# Patient Record
Sex: MALE | Race: WHITE | NOT HISPANIC OR LATINO | Employment: FULL TIME | URBAN - METROPOLITAN AREA
[De-identification: names, ages, dates, MRNs, and addresses within clinical notes are randomized per-mention and may not be internally consistent; named-entity substitution may affect disease eponyms.]

---

## 2018-07-26 ENCOUNTER — HOSPITAL ENCOUNTER (EMERGENCY)
Facility: HOSPITAL | Age: 33
Discharge: HOME | End: 2018-07-26
Attending: EMERGENCY MEDICINE | Admitting: EMERGENCY MEDICINE

## 2018-07-26 ENCOUNTER — APPOINTMENT (OUTPATIENT)
Dept: GENERAL RADIOLOGY | Facility: HOSPITAL | Age: 33
End: 2018-07-26

## 2018-07-26 VITALS
DIASTOLIC BLOOD PRESSURE: 72 MMHG | RESPIRATION RATE: 18 BRPM | HEART RATE: 54 BPM | HEIGHT: 72 IN | OXYGEN SATURATION: 97 % | SYSTOLIC BLOOD PRESSURE: 122 MMHG | BODY MASS INDEX: 27.77 KG/M2 | TEMPERATURE: 98.1 F | WEIGHT: 205 LBS

## 2018-07-26 DIAGNOSIS — S61.511A LACERATION OF RIGHT WRIST, INITIAL ENCOUNTER: Primary | ICD-10-CM

## 2018-07-26 PROCEDURE — 73110 X-RAY EXAM OF WRIST: CPT

## 2018-07-26 PROCEDURE — 99282 EMERGENCY DEPT VISIT SF MDM: CPT

## 2018-07-26 RX ORDER — LIDOCAINE HYDROCHLORIDE AND EPINEPHRINE 10; 10 MG/ML; UG/ML
10 INJECTION, SOLUTION INFILTRATION; PERINEURAL ONCE
Status: DISCONTINUED | OUTPATIENT
Start: 2018-07-26 | End: 2018-07-26 | Stop reason: HOSPADM

## 2018-07-26 RX ORDER — HYDROCODONE BITARTRATE AND ACETAMINOPHEN 5; 325 MG/1; MG/1
1 TABLET ORAL EVERY 6 HOURS PRN
Qty: 6 TABLET | Refills: 0 | Status: SHIPPED | OUTPATIENT
Start: 2018-07-26 | End: 2021-09-16

## 2018-07-26 RX ORDER — CEPHALEXIN 500 MG/1
500 CAPSULE ORAL 3 TIMES DAILY
Qty: 30 CAPSULE | Refills: 0 | Status: SHIPPED | OUTPATIENT
Start: 2018-07-26 | End: 2021-09-16

## 2021-09-16 ENCOUNTER — HOSPITAL ENCOUNTER (EMERGENCY)
Facility: HOSPITAL | Age: 36
Discharge: HOME | End: 2021-09-16
Attending: EMERGENCY MEDICINE | Admitting: EMERGENCY MEDICINE

## 2021-09-16 VITALS
BODY MASS INDEX: 20.54 KG/M2 | WEIGHT: 155 LBS | SYSTOLIC BLOOD PRESSURE: 158 MMHG | OXYGEN SATURATION: 98 % | HEART RATE: 92 BPM | DIASTOLIC BLOOD PRESSURE: 97 MMHG | TEMPERATURE: 98.4 F | RESPIRATION RATE: 17 BRPM | HEIGHT: 73 IN

## 2021-09-16 DIAGNOSIS — J30.2 SEASONAL ALLERGIC RHINITIS, UNSPECIFIED TRIGGER: ICD-10-CM

## 2021-09-16 DIAGNOSIS — J01.10 ACUTE NON-RECURRENT FRONTAL SINUSITIS: ICD-10-CM

## 2021-09-16 DIAGNOSIS — J02.9 VIRAL PHARYNGITIS: Primary | ICD-10-CM

## 2021-09-16 LAB — S PYO AG THROAT QL: NEGATIVE

## 2021-09-16 PROCEDURE — 99283 EMERGENCY DEPT VISIT LOW MDM: CPT

## 2021-09-16 PROCEDURE — 87081 CULTURE SCREEN ONLY: CPT | Performed by: NURSE PRACTITIONER

## 2021-09-16 PROCEDURE — 87880 STREP A ASSAY W/OPTIC: CPT | Performed by: NURSE PRACTITIONER

## 2021-09-16 RX ORDER — IBUPROFEN 400 MG/1
800 TABLET ORAL ONCE
Status: COMPLETED | OUTPATIENT
Start: 2021-09-16 | End: 2021-09-16

## 2021-09-16 RX ORDER — BUPROPION HYDROCHLORIDE 75 MG/1
75 TABLET ORAL 2 TIMES DAILY
COMMUNITY
End: 2023-01-11

## 2021-09-16 RX ORDER — FLUTICASONE PROPIONATE 50 MCG
2 SPRAY, SUSPENSION (ML) NASAL DAILY
Qty: 16 G | Refills: 0 | Status: SHIPPED | OUTPATIENT
Start: 2021-09-16 | End: 2023-01-11

## 2021-09-16 RX ORDER — CETIRIZINE HYDROCHLORIDE, PSEUDOEPHEDRINE HYDROCHLORIDE 5; 120 MG/1; MG/1
1 TABLET, FILM COATED, EXTENDED RELEASE ORAL 2 TIMES DAILY
Qty: 60 TABLET | Refills: 0 | Status: SHIPPED | OUTPATIENT
Start: 2021-09-16 | End: 2021-10-16

## 2021-09-16 RX ADMIN — IBUPROFEN 800 MG: 400 TABLET, FILM COATED ORAL at 18:28

## 2021-09-16 NOTE — ED PROVIDER NOTES
Subjective   Sore throat, postnasal drip and sinus drainage for 3 days.      History provided by:  Patient   used: No        Review of Systems   Constitutional: Negative.    HENT: Positive for postnasal drip, rhinorrhea, sinus pressure and sore throat.    Eyes: Negative.    Respiratory: Negative.    Cardiovascular: Negative.    Gastrointestinal: Negative.    Endocrine: Negative.    Genitourinary: Negative.    Musculoskeletal: Negative.    Skin: Negative.    Allergic/Immunologic: Negative.    Neurological: Negative.    Hematological: Negative.    Psychiatric/Behavioral: Negative.        Past Medical History:   Diagnosis Date   • Allergy to alpha-gal    • Depression        No Known Allergies    Past Surgical History:   Procedure Laterality Date   • EXPLORATORY LAPAROTOMY         History reviewed. No pertinent family history.    Social History     Socioeconomic History   • Marital status: Single     Spouse name: Not on file   • Number of children: Not on file   • Years of education: Not on file   • Highest education level: Not on file   Tobacco Use   • Smoking status: Former Smoker     Packs/day: 0.50     Years: 3.00     Pack years: 1.50   • Smokeless tobacco: Current User     Types: Chew   Substance and Sexual Activity   • Alcohol use: No   • Drug use: No           Objective   Physical Exam  Constitutional:       Appearance: Normal appearance.   HENT:      Head: Normocephalic and atraumatic.      Right Ear: Tympanic membrane normal.      Left Ear: Tympanic membrane normal.      Nose: Congestion and rhinorrhea present.      Mouth/Throat:      Mouth: Mucous membranes are moist.      Pharynx: Posterior oropharyngeal erythema present.      Tonsils: No tonsillar exudate or tonsillar abscesses. 1+ on the right. 1+ on the left.   Eyes:      Pupils: Pupils are equal, round, and reactive to light.   Cardiovascular:      Rate and Rhythm: Normal rate and regular rhythm.      Pulses: Normal pulses.    Pulmonary:      Effort: Pulmonary effort is normal.      Breath sounds: Normal breath sounds.   Abdominal:      General: Abdomen is flat. Bowel sounds are normal.      Palpations: Abdomen is soft.   Musculoskeletal:         General: Normal range of motion.      Cervical back: Normal range of motion.   Skin:     General: Skin is warm and dry.      Capillary Refill: Capillary refill takes less than 2 seconds.   Neurological:      General: No focal deficit present.      Mental Status: He is alert and oriented to person, place, and time. Mental status is at baseline.   Psychiatric:         Mood and Affect: Mood normal.         Procedures           ED Course                                           MDM  Number of Diagnoses or Management Options  Acute non-recurrent frontal sinusitis: minor  Seasonal allergic rhinitis, unspecified trigger: minor  Viral pharyngitis: new and requires workup     Amount and/or Complexity of Data Reviewed  Clinical lab tests: reviewed and ordered    Risk of Complications, Morbidity, and/or Mortality  Presenting problems: low  Diagnostic procedures: low  Management options: low    Patient Progress  Patient progress: stable      Final diagnoses:   Viral pharyngitis   Acute non-recurrent frontal sinusitis   Seasonal allergic rhinitis, unspecified trigger       ED Disposition  ED Disposition     ED Disposition Condition Comment    Discharge Stable           Provider, No Known  [place] [id number]      As needed         Medication List      New Prescriptions    cetirizine-pseudoephedrine 5-120 MG per 12 hr tablet  Commonly known as: ZyrTEC-D  Take 1 tablet by mouth 2 (Two) Times a Day for 30 days.     fluticasone 50 MCG/ACT nasal spray  Commonly known as: FLONASE  2 sprays into the nostril(s) as directed by provider Daily.           Where to Get Your Medications      These medications were sent to [id number] - [place], [place] - [address] AT [place]  [facility] - [phone] [facility] [phone] [address]  [address] [place] [address]    Hours: 24-hours Phone: [phone]   · cetirizine-pseudoephedrine 5-120 MG per 12 hr tablet  · fluticasone 50 MCG/ACT nasal spray          Lorie Morse, YIMI  09/16/21 1927

## 2021-09-19 LAB — BACTERIA SPEC AEROBE CULT: NORMAL

## 2022-01-20 PROCEDURE — U0004 COV-19 TEST NON-CDC HGH THRU: HCPCS | Performed by: NURSE PRACTITIONER

## 2022-01-21 ENCOUNTER — TELEPHONE (OUTPATIENT)
Dept: URGENT CARE | Facility: CLINIC | Age: 37
End: 2022-01-21

## 2022-01-21 NOTE — TELEPHONE ENCOUNTER
Called patient, results reviewed, symptoms are stable, continue quarantine and supportive care, follow up as needed or if symptoms worsen or persist, patient verbalizes understanding.

## 2022-06-05 ENCOUNTER — HOSPITAL ENCOUNTER (EMERGENCY)
Facility: HOSPITAL | Age: 37
Discharge: HOME | End: 2022-06-06
Attending: EMERGENCY MEDICINE | Admitting: EMERGENCY MEDICINE

## 2022-06-05 DIAGNOSIS — T78.40XA ALLERGIC REACTION, INITIAL ENCOUNTER: Primary | ICD-10-CM

## 2022-06-05 PROCEDURE — 25010000002 DIPHENHYDRAMINE PER 50 MG: Performed by: EMERGENCY MEDICINE

## 2022-06-05 PROCEDURE — 96374 THER/PROPH/DIAG INJ IV PUSH: CPT

## 2022-06-05 PROCEDURE — 25010000002 METHYLPREDNISOLONE PER 125 MG: Performed by: EMERGENCY MEDICINE

## 2022-06-05 PROCEDURE — 96375 TX/PRO/DX INJ NEW DRUG ADDON: CPT

## 2022-06-05 PROCEDURE — 99283 EMERGENCY DEPT VISIT LOW MDM: CPT

## 2022-06-05 RX ORDER — METHYLPREDNISOLONE SODIUM SUCCINATE 125 MG/2ML
125 INJECTION, POWDER, LYOPHILIZED, FOR SOLUTION INTRAMUSCULAR; INTRAVENOUS ONCE
Status: COMPLETED | OUTPATIENT
Start: 2022-06-05 | End: 2022-06-05

## 2022-06-05 RX ORDER — DIPHENHYDRAMINE HYDROCHLORIDE 50 MG/ML
25 INJECTION INTRAMUSCULAR; INTRAVENOUS ONCE
Status: COMPLETED | OUTPATIENT
Start: 2022-06-05 | End: 2022-06-05

## 2022-06-05 RX ORDER — CITALOPRAM 10 MG/1
10 TABLET ORAL DAILY
COMMUNITY

## 2022-06-05 RX ORDER — FAMOTIDINE 10 MG/ML
20 INJECTION, SOLUTION INTRAVENOUS ONCE
Status: COMPLETED | OUTPATIENT
Start: 2022-06-05 | End: 2022-06-05

## 2022-06-05 RX ADMIN — DIPHENHYDRAMINE HYDROCHLORIDE 25 MG: 50 INJECTION, SOLUTION INTRAMUSCULAR; INTRAVENOUS at 22:03

## 2022-06-05 RX ADMIN — METHYLPREDNISOLONE SODIUM SUCCINATE 125 MG: 125 INJECTION, POWDER, FOR SOLUTION INTRAMUSCULAR; INTRAVENOUS at 22:04

## 2022-06-05 RX ADMIN — FAMOTIDINE 20 MG: 10 INJECTION INTRAVENOUS at 22:04

## 2022-06-06 VITALS
DIASTOLIC BLOOD PRESSURE: 79 MMHG | WEIGHT: 205.69 LBS | BODY MASS INDEX: 27.26 KG/M2 | SYSTOLIC BLOOD PRESSURE: 131 MMHG | OXYGEN SATURATION: 94 % | TEMPERATURE: 98.4 F | HEART RATE: 66 BPM | RESPIRATION RATE: 24 BRPM | HEIGHT: 73 IN

## 2022-06-06 NOTE — ED PROVIDER NOTES
Time: 10:40 PM EDT  Arrived by: private car  Chief Complaint: Allergic reaction  History provided by: Patient  History is limited by: N/A     History of Present Illness:  Patient is a 37 y.o. year old male who presents to the emergency department with complaints of allergic reaction.  Patient states he has alpha gal and has had multiple allergic reactions before.  States drank a protein shake and pre work-out drink then shortly after began having allergic reaction.    HPI    Similar Symptoms Previously: Yes  Recently seen: No      Patient Care Team  Primary Care Provider: Provider, No Known    Past Medical History:     Allergies   Allergen Reactions   • Other Anaphylaxis     Red meat, pork due to tick bite.      Past Medical History:   Diagnosis Date   • Allergy to alpha-gal    • Depression      Past Surgical History:   Procedure Laterality Date   • EXPLORATORY LAPAROTOMY       Family History   Problem Relation Age of Onset   • No Known Problems Mother    • No Known Problems Father    • No Known Problems Sister    • No Known Problems Brother    • No Known Problems Son    • No Known Problems Daughter    • No Known Problems Maternal Grandmother    • No Known Problems Maternal Grandfather    • No Known Problems Paternal Grandmother    • No Known Problems Paternal Grandfather    • No Known Problems Cousin    • Rheum arthritis Neg Hx    • Osteoarthritis Neg Hx    • Asthma Neg Hx    • Diabetes Neg Hx    • Heart failure Neg Hx    • Hyperlipidemia Neg Hx    • Hypertension Neg Hx    • Migraines Neg Hx    • Rashes / Skin problems Neg Hx    • Seizures Neg Hx    • Stroke Neg Hx    • Thyroid disease Neg Hx        Home Medications:  Prior to Admission medications    Medication Sig Start Date End Date Taking? Authorizing Provider   buPROPion (WELLBUTRIN) 75 MG tablet Take 75 mg by mouth 2 (Two) Times a Day.    Provider, MD Rosi   buPROPion SR (WELLBUTRIN SR) 150 MG 12 hr tablet Take 150 mg by mouth 2 (Two) Times a Day.  "1/26/22   Emergency, Nurse Epic, RN   cetirizine-pseudoephedrine (ZyrTEC-D) 5-120 MG per 12 hr tablet Take 1 tablet by mouth 2 (Two) Times a Day for 30 days. 9/16/21 10/16/21  Lorie Morse APRN   citalopram (CeleXA) 10 MG tablet Take 10 mg by mouth Daily.    Provider, MD Rosi   fluticasone (FLONASE) 50 MCG/ACT nasal spray 2 sprays into the nostril(s) as directed by provider Daily. 9/16/21   Lorie Morse APRN   ondansetron ODT (ZOFRAN-ODT) 4 MG disintegrating tablet Place 1 tablet on the tongue Every 8 (Eight) Hours As Needed for Nausea or Vomiting. 4/7/22   Tatiana Ordoñez APRN   sildenafil (REVATIO) 20 MG tablet TAKE 3 TO 5 TABLETS BY MOUTH DAILY AS NEEDED 1/20/22   Emergency, Nurse Abby RN        Social History:   Social History     Tobacco Use   • Smoking status: Former Smoker     Packs/day: 0.50     Years: 3.00     Pack years: 1.50   • Smokeless tobacco: Former User     Types: Chew   Vaping Use   • Vaping Use: Every day   • Substances: Nicotine   Substance Use Topics   • Alcohol use: No   • Drug use: Not Currently     Types: IV, Methamphetamines     Recent travel: no     Review of Systems:  Review of Systems   Constitutional: Negative for chills and fever.   HENT: Negative for congestion, rhinorrhea and sore throat.    Eyes: Negative for pain and visual disturbance.   Respiratory: Negative for apnea, cough, chest tightness and shortness of breath.    Cardiovascular: Negative for chest pain and palpitations.   Gastrointestinal: Negative for abdominal pain, diarrhea, nausea and vomiting.   Genitourinary: Negative for difficulty urinating and dysuria.   Musculoskeletal: Negative for joint swelling and myalgias.   Skin: Negative for color change.   Neurological: Negative for seizures and headaches.   Psychiatric/Behavioral: Negative.    All other systems reviewed and are negative.       Physical Exam:  /75   Pulse 67   Temp 98.4 °F (36.9 °C) (Oral)   Resp 24   Ht 185.4 cm (73\")   " Wt 93.3 kg (205 lb 11 oz)   SpO2 94%   BMI 27.14 kg/m²     Physical Exam  Vitals and nursing note reviewed.   Constitutional:       General: He is not in acute distress.     Appearance: Normal appearance. He is not toxic-appearing.   HENT:      Head: Normocephalic and atraumatic.      Jaw: There is normal jaw occlusion.   Eyes:      General: Lids are normal.      Extraocular Movements: Extraocular movements intact.      Conjunctiva/sclera: Conjunctivae normal.      Pupils: Pupils are equal, round, and reactive to light.   Cardiovascular:      Rate and Rhythm: Normal rate and regular rhythm.      Pulses: Normal pulses.      Heart sounds: Normal heart sounds.   Pulmonary:      Effort: Pulmonary effort is normal. No respiratory distress.      Breath sounds: Normal breath sounds. No wheezing or rhonchi.   Abdominal:      General: Abdomen is flat.      Palpations: Abdomen is soft.      Tenderness: There is no abdominal tenderness. There is no guarding or rebound.   Musculoskeletal:         General: Normal range of motion.      Cervical back: Normal range of motion and neck supple.      Right lower leg: No edema.      Left lower leg: No edema.   Skin:     General: Skin is warm.      Comments: Diaphoretic.  Hives to the neck, bilateral upper extremities, chest, abdomen.   Neurological:      Mental Status: He is alert and oriented to person, place, and time. Mental status is at baseline.   Psychiatric:         Mood and Affect: Mood normal.                Medications in the Emergency Department:  Medications   diphenhydrAMINE (BENADRYL) injection 25 mg (25 mg Intravenous Given 6/5/22 2203)   methylPREDNISolone sodium succinate (SOLU-Medrol) injection 125 mg (125 mg Intravenous Given 6/5/22 2204)   famotidine (PEPCID) injection 20 mg (20 mg Intravenous Given 6/5/22 2204)        Labs  Lab Results (last 24 hours)     ** No results found for the last 24 hours. **           Imaging:  No Radiology Exams Resulted Within Past 24  Hours    Procedures:  Procedures    Progress                            Medical Decision Making:  MDM  Number of Diagnoses or Management Options  Allergic reaction, initial encounter  Diagnosis management comments: In summary this is a 37-year-old male reports history of alpha gal and states he is having allergic reaction after drinking a protein shake.  After medication including Solu-Medrol, Pepcid, Benadryl in the emergency department his reaction has cleared and he feels better.  Very strict return to ER and follow-up instructions have been provided to the patient.         Final diagnoses:   Allergic reaction, initial encounter        Disposition:  ED Disposition     ED Disposition   Discharge    Condition   Stable    Comment   --             This medical record created using voice recognition software.           August Villareal MD  06/06/22 0018

## 2024-11-16 ENCOUNTER — HOSPITAL ENCOUNTER (EMERGENCY)
Facility: HOSPITAL | Age: 39
Discharge: HOME | End: 2024-11-16
Attending: EMERGENCY MEDICINE
Payer: COMMERCIAL

## 2024-11-16 VITALS
HEART RATE: 66 BPM | WEIGHT: 215.61 LBS | OXYGEN SATURATION: 96 % | RESPIRATION RATE: 15 BRPM | DIASTOLIC BLOOD PRESSURE: 80 MMHG | HEIGHT: 73 IN | BODY MASS INDEX: 28.58 KG/M2 | SYSTOLIC BLOOD PRESSURE: 125 MMHG | TEMPERATURE: 97.6 F

## 2024-11-16 DIAGNOSIS — T78.1XXA ALLERGIC REACTION TO ALPHA-GAL: Primary | ICD-10-CM

## 2024-11-16 PROCEDURE — 25010000002 METHYLPREDNISOLONE PER 125 MG: Performed by: EMERGENCY MEDICINE

## 2024-11-16 PROCEDURE — 25010000002 DIPHENHYDRAMINE PER 50 MG: Performed by: EMERGENCY MEDICINE

## 2024-11-16 PROCEDURE — 99283 EMERGENCY DEPT VISIT LOW MDM: CPT

## 2024-11-16 PROCEDURE — 96374 THER/PROPH/DIAG INJ IV PUSH: CPT

## 2024-11-16 PROCEDURE — 96375 TX/PRO/DX INJ NEW DRUG ADDON: CPT

## 2024-11-16 RX ORDER — FAMOTIDINE 10 MG/ML
20 INJECTION, SOLUTION INTRAVENOUS ONCE
Status: COMPLETED | OUTPATIENT
Start: 2024-11-16 | End: 2024-11-16

## 2024-11-16 RX ORDER — CALCIUM CARBONATE 500 MG/1
2 TABLET, CHEWABLE ORAL ONCE
Status: COMPLETED | OUTPATIENT
Start: 2024-11-16 | End: 2024-11-16

## 2024-11-16 RX ORDER — DESVENLAFAXINE 50 MG/1
50 TABLET, FILM COATED, EXTENDED RELEASE ORAL DAILY
COMMUNITY

## 2024-11-16 RX ORDER — PREDNISONE 50 MG/1
50 TABLET ORAL DAILY
Qty: 5 TABLET | Refills: 0 | Status: SHIPPED | OUTPATIENT
Start: 2024-11-16 | End: 2024-11-21

## 2024-11-16 RX ORDER — METHYLPREDNISOLONE SODIUM SUCCINATE 125 MG/2ML
125 INJECTION, POWDER, LYOPHILIZED, FOR SOLUTION INTRAMUSCULAR; INTRAVENOUS ONCE
Status: COMPLETED | OUTPATIENT
Start: 2024-11-16 | End: 2024-11-16

## 2024-11-16 RX ORDER — SODIUM CHLORIDE 0.9 % (FLUSH) 0.9 %
10 SYRINGE (ML) INJECTION AS NEEDED
Status: DISCONTINUED | OUTPATIENT
Start: 2024-11-16 | End: 2024-11-16 | Stop reason: HOSPADM

## 2024-11-16 RX ORDER — DIPHENHYDRAMINE HYDROCHLORIDE 50 MG/ML
25 INJECTION INTRAMUSCULAR; INTRAVENOUS ONCE
Status: COMPLETED | OUTPATIENT
Start: 2024-11-16 | End: 2024-11-16

## 2024-11-16 RX ORDER — EPINEPHRINE 0.3 MG/.3ML
0.3 INJECTION SUBCUTANEOUS ONCE
Qty: 1 EACH | Refills: 0 | Status: SHIPPED | OUTPATIENT
Start: 2024-11-16 | End: 2024-11-16

## 2024-11-16 RX ADMIN — FAMOTIDINE 20 MG: 10 INJECTION INTRAVENOUS at 04:31

## 2024-11-16 RX ADMIN — CALCIUM CARBONATE 2 TABLET: 500 TABLET, CHEWABLE ORAL at 05:09

## 2024-11-16 RX ADMIN — METHYLPREDNISOLONE SODIUM SUCCINATE 125 MG: 125 INJECTION, POWDER, FOR SOLUTION INTRAMUSCULAR; INTRAVENOUS at 04:31

## 2024-11-16 RX ADMIN — DIPHENHYDRAMINE HYDROCHLORIDE 25 MG: 50 INJECTION, SOLUTION INTRAMUSCULAR; INTRAVENOUS at 04:31

## 2024-11-16 NOTE — Clinical Note
[facility] EMERGENCY ROOM  [address] [place] [address]  Phone: [phone]  Fax: [phone]    Romulo Monahan was seen and treated in our emergency department on 11/16/2024.  He may return to work on 11/18/2024.         Thank you for choosing [facility].    Porfirio Barroso MD

## 2024-11-16 NOTE — ED PROVIDER NOTES
Time: 4:46 AM EST  Date of encounter:  11/16/2024  Independent Historian/Clinical History and Information was obtained by:   Patient    History is limited by: N/A    Chief Complaint: Allergic reaction      History of Present Illness:  Patient is a 39 y.o. year old male who presents to the emergency department for evaluation of allergic reaction to pork.  Patient states he ate pork at 7:30 PM yesterday.  He has known alpha gal syndrome but has been tolerating some meats recently.  Awoke prior to arrival with diffuse body burning/itching sensation with erythematous rash and some feeling of difficulty breathing and/or throat swelling.  He did take an EpiPen at home prior to arrival.      Patient Care Team  Primary Care Provider: Provider, No Known    Past Medical History:     Allergies   Allergen Reactions    Alpha-Gal Anaphylaxis    Other Anaphylaxis     Red meat, pork due to tick bite.      Past Medical History:   Diagnosis Date    Allergy to alpha-gal     Depression      Past Surgical History:   Procedure Laterality Date    EXPLORATORY LAPAROTOMY       Family History   Problem Relation Age of Onset    No Known Problems Mother     No Known Problems Father     No Known Problems Sister     No Known Problems Brother     No Known Problems Son     No Known Problems Daughter     No Known Problems Maternal Grandmother     No Known Problems Maternal Grandfather     No Known Problems Paternal Grandmother     No Known Problems Paternal Grandfather     No Known Problems Cousin     Rheum arthritis Neg Hx     Osteoarthritis Neg Hx     Asthma Neg Hx     Diabetes Neg Hx     Heart failure Neg Hx     Hyperlipidemia Neg Hx     Hypertension Neg Hx     Migraines Neg Hx     Rashes / Skin problems Neg Hx     Seizures Neg Hx     Stroke Neg Hx     Thyroid disease Neg Hx        Home Medications:  Prior to Admission medications    Medication Sig Start Date End Date Taking? Authorizing Provider   buPROPion SR (WELLBUTRIN SR) 150 MG 12 hr  "tablet Take 150 mg by mouth 2 (Two) Times a Day. 1/26/22   Emergency, Nurse Epic, RN   cetirizine-pseudoephedrine (ZyrTEC-D) 5-120 MG per 12 hr tablet Take 1 tablet by mouth 2 (Two) Times a Day for 30 days. 9/16/21 10/16/21  Lorie Morse APRN   desvenlafaxine (PRISTIQ) 50 MG 24 hr tablet Take 1 tablet by mouth Daily.    Provider, MD Rosi   citalopram (CeleXA) 10 MG tablet Take 10 mg by mouth Daily.  11/16/24  Provider, MD Rosi        Social History:   Social History     Tobacco Use    Smoking status: Former     Current packs/day: 0.50     Average packs/day: 0.5 packs/day for 3.0 years (1.5 ttl pk-yrs)     Types: Cigarettes    Smokeless tobacco: Current     Types: Chew   Vaping Use    Vaping status: Every Day    Substances: Nicotine   Substance Use Topics    Alcohol use: No    Drug use: Not Currently     Types: IV, Methamphetamines         Review of Systems:  Review of Systems   Constitutional:  Negative for chills and fever.   HENT:  Positive for trouble swallowing. Negative for congestion, rhinorrhea and sore throat.    Eyes:  Negative for photophobia.   Respiratory:  Positive for shortness of breath. Negative for apnea, cough and chest tightness.    Cardiovascular:  Negative for chest pain and palpitations.   Gastrointestinal:  Negative for abdominal pain, diarrhea, nausea and vomiting.   Endocrine: Negative.    Genitourinary:  Negative for difficulty urinating and dysuria.   Musculoskeletal:  Negative for back pain, joint swelling and myalgias.   Skin:  Positive for rash. Negative for color change and wound.   Allergic/Immunologic: Negative.    Neurological:  Negative for seizures and headaches.   Psychiatric/Behavioral: Negative.     All other systems reviewed and are negative.       Physical Exam:  /80 (Patient Position: Lying)   Pulse 66   Temp 97.6 °F (36.4 °C) (Oral)   Resp 15   Ht 185.4 cm (73\")   Wt 97.8 kg (215 lb 9.8 oz)   SpO2 96%   BMI 28.45 kg/m²     Physical " Exam  Vitals and nursing note reviewed.   Constitutional:       General: He is awake.      Appearance: Normal appearance.   HENT:      Head: Normocephalic and atraumatic.      Nose: Nose normal.      Mouth/Throat:      Mouth: Mucous membranes are moist.      Comments: No angioedema  Eyes:      Extraocular Movements: Extraocular movements intact.      Pupils: Pupils are equal, round, and reactive to light.   Cardiovascular:      Rate and Rhythm: Normal rate and regular rhythm.      Heart sounds: Normal heart sounds.   Pulmonary:      Effort: Pulmonary effort is normal. No respiratory distress.      Breath sounds: Normal breath sounds. No wheezing, rhonchi or rales.   Abdominal:      General: Bowel sounds are normal.      Palpations: Abdomen is soft.      Tenderness: There is no abdominal tenderness. There is no guarding or rebound.      Comments: No rigidity   Musculoskeletal:         General: No tenderness. Normal range of motion.      Cervical back: Normal range of motion and neck supple.   Skin:     General: Skin is warm and dry.      Coloration: Skin is not jaundiced.      Comments: Very faint generalized erythema with infrequent areas of mild possible urticaria.   Neurological:      General: No focal deficit present.      Mental Status: Mental status is at baseline.   Psychiatric:         Mood and Affect: Mood normal.                  Procedures:  Procedures      Medical Decision Making:      Comorbidities that affect care:    Depression, alpha-gal syndrome    External Notes reviewed:    None      The following orders were placed and all results were independently analyzed by me:  Orders Placed This Encounter   Procedures    Insert Peripheral IV       Medications Given in the Emergency Department:  Medications   sodium chloride 0.9 % flush 10 mL (has no administration in time range)   diphenhydrAMINE (BENADRYL) injection 25 mg (25 mg Intravenous Given 11/16/24 0436)   famotidine (PEPCID) injection 20 mg (20 mg  Intravenous Given 11/16/24 0431)   methylPREDNISolone sodium succinate (SOLU-Medrol) injection 125 mg (125 mg Intravenous Given 11/16/24 0431)   calcium carbonate (TUMS) chewable tablet 500 mg (200 mg elemental) (2 tablets Oral Given 11/16/24 1198)        ED Course:         Labs:    Lab Results (last 24 hours)       ** No results found for the last 24 hours. **             Imaging:    No Radiology Exams Resulted Within Past 24 Hours      Differential Diagnosis and Discussion:    Allergic Reaction: Differential diagnosis includes but is not limited to drug side effects, contact dermatitis, autoimmune conditions, infections, mast cell disorders, serum sickness, anaphylactoid reactions, angioedema, panic or anxiety attacks.        [facility]                     Patient Care Considerations:    ANTIBIOTICS: I considered prescribing antibiotics as an outpatient however no bacterial focus of infection was found.      Consultants/Shared Management Plan:    None    Social Determinants of Health:    Patient is independent, reliable, and has access to care.       Disposition and Care Coordination:    Discharged: The patient is suitable and stable for discharge with no need for consideration of admission.    I have explained the patient´s condition, diagnoses and treatment plan based on the information available to me at this time. I have answered questions and addressed any concerns. The patient has a good  understanding of the patient´s diagnosis, condition, and treatment plan as can be expected at this point. The vital signs have been stable. The patient´s condition is stable and appropriate for discharge from the emergency department.      The patient will pursue further outpatient evaluation with the primary care physician or other designated or consulting physician as outlined in the discharge instructions. They are agreeable to this plan of care and follow-up instructions have been explained in detail. The patient has received  these instructions in written format and has expressed an understanding of the discharge instructions. The patient is aware that any significant change in condition or worsening of symptoms should prompt an immediate return to this or the closest emergency department or call to 911.    Final diagnoses:   Allergic reaction to alpha-gal        ED Disposition       ED Disposition   Discharge    Condition   Stable    Comment   --               This medical record created using voice recognition software.             Porfirio Barroso MD  11/16/24 0720

## 2025-06-05 ENCOUNTER — OFFICE VISIT (OUTPATIENT)
Age: 40
End: 2025-06-05
Payer: COMMERCIAL

## 2025-06-05 VITALS
WEIGHT: 231 LBS | HEIGHT: 73 IN | SYSTOLIC BLOOD PRESSURE: 129 MMHG | HEART RATE: 90 BPM | OXYGEN SATURATION: 97 % | BODY MASS INDEX: 30.62 KG/M2 | DIASTOLIC BLOOD PRESSURE: 82 MMHG

## 2025-06-05 DIAGNOSIS — F12.20 TETRAHYDROCANNABINOL (THC) DEPENDENCE: ICD-10-CM

## 2025-06-05 DIAGNOSIS — F15.20 OTHER STIMULANT DEPENDENCE, UNCOMPLICATED: ICD-10-CM

## 2025-06-05 DIAGNOSIS — E34.9 HYPOTESTOSTERONISM: ICD-10-CM

## 2025-06-05 DIAGNOSIS — F41.1 GENERALIZED ANXIETY DISORDER: ICD-10-CM

## 2025-06-05 DIAGNOSIS — F11.21 OPIOID DEPENDENCE IN REMISSION: Primary | ICD-10-CM

## 2025-06-05 RX ORDER — BUPRENORPHINE HYDROCHLORIDE AND NALOXONE HYDROCHLORIDE DIHYDRATE 8; 2 MG/1; MG/1
2 TABLET SUBLINGUAL DAILY
Qty: 16 TABLET | Refills: 0 | Status: SHIPPED | OUTPATIENT
Start: 2025-06-05

## 2025-06-05 NOTE — PROGRESS NOTES
Office  Note     Patient Name: Romulo Monahan  : 1985   MRN: [id number]     Referring Provider: Carl Bernal MD    Chief Complaint: Substance use    History of Present Illness:   HPI    Romulo Monahan is a 40 y.o. male who is here today for initial evaluation related to a substance use disorder.  The patient's goal for treatment is to get clean and sober and build a life with a woman who is in the room with us today and be a competent parent to their children and a positive role model further grandchildren.    History of drug use this 40-year-old male has a history of opiate use disorder since age 22 he has been using opiates daily since age 24 for the last 16 years.  He last use 80 mg of oxycodone today.  He can consume up to 200 mg of oxycodone a day though usually it is at that dose from some of the fake 30 mg Percocets Percodans which have caused a problem for him before due to some accidental fentanyl overdoses which is part of his motivation for quitting.  We treated him for this opiate addiction 10 years ago and the [facility] office but only for 2 visits.  We prescribed 16 mg of Suboxone film at the time but he was not ready to quit at age 30.  He was using a lot of methamphetamine at the time as well but he stopped that medicine on a daily basis 5 years ago with only a couple of doses since then with the last being 3 years ago.  Today he mainly wants help with the opiate use disorder.    He is not using any sedative hypnotics nor any alcohol to speak of.  He does take about 3 hits on a marijuana joint 5 to 6 days a week so he will go through about 3 joints a week meaning he is consuming about a half a joint on the days he uses it which accounts for about 2 or 3 hits in the evening after he gets home from work.  He is also smoking about 1/2 pack of cigarettes a day but clearly his main addiction problems with the opiates which is what he craves on a daily basis so he  wants to get back on the Suboxone.    When he stopped the methamphetamine 5 years ago he does got sick and tired of being sick and tired.  He stopped using that drug mainly by himself even before he met his current girlfriend of 4 years which is an impressive effort that indicates he has matured largely which is what he describes.  He says he is grown up a lot in the last 10 years.    Negative consequences of drug use include still being on probation for another 6 months does like he was 10 years ago.  He has not some anger management and life skills classes that are court ordered.  He in the last 10 years has had a FDC    History of drug abuse treatment he has had no significant substance abuse treatment because he only came to see us for 2 visits 10 years ago.  He has never had a sponsor and has never been to an  meeting.  He has never been in any detoxification settings though he has gone to the emergency room with his overdoses before.Goals for treatment time of 7 months about 6 years ago.  Apparently the FDC time was therapeutic as he quit using the methamphetamine shortly after that.  He is just learned that the methamphetamine is not any fun anymore the partying is not the way he wants to live his life now that he has got a fiancé and lives with her plus her 4 kids and his 2 kids.  The patient reports that he has a therapist at community [facility] by the name of Tangela who he thinks is in [facility].    Social History:  Social History     Socioeconomic History    Marital status: Single    Number of children: 2    Years of education: 12TH GRADE    Highest education level: 12th grade   Tobacco Use    Smoking status: Former     Current packs/day: 0.50     Average packs/day: 0.5 packs/day for 3.0 years (1.5 ttl pk-yrs)     Types: Cigarettes    Smokeless tobacco: Current     Types: Chew   Vaping Use    Vaping status: Every Day    Substances: Nicotine   Substance and Sexual Activity    Alcohol use: No    Drug use: Not  Currently     Types: IV, Methamphetamines    Sexual activity: Yes     Partners: Female   The patient lives with his girlfriend of 4 years who is a fiancé.  She is in the room during our evaluation of the patient today.  She has no addiction history herself though she is on some prescription medicine.  They are aware to prevent and protect their 6 children from getting into any of these medications that are by prescription and thus potentially deadly via overdose.  She has a full-time job at [employer].  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  Apparently the girlfriend has a grandchild on the way the patient's 2 children are twins that are 10 years old at this time.    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  He also needs to work on his recovery skills for which she has had very little guidance.  Shortly will be able to evaluate with therapeutic value his therapist at [facility] is providing.  Right now it appears his motivation is at a high level but he is having no physical withdrawals at this time due to his 80 mg of oxycodone he had this morning so we will have to start his Suboxone with a micro dosing protocol over the next 30 hours.    Cravings: He has no craving right now but he has had an every day so we explained that he will still have  some even after he starts the Suboxone when life throws him the inevitable curve ball.    Relapse Prevention: Relapse consists prevention consists of starting his 60 mg of Suboxone tablets today with him consuming via a micro dosing schedule of 2 mg every 4-6 hours over the next 30 hours to get on 16 mg a day by tomorrow night so that 2 days from now he can just take the whole 60 mg dose once a day in the morning.  He will also come back and see if there are therapist here ASAP and come back and see the physician in 1 week.  He is instructed to call tomorrow if he has any difficulty transitioning over to the Suboxone.  He is advised to completely destroy any oxycodone he has left.  If he wants to bring it to us will destroyed for him.    UDS Confirmation: Pending.  Lab work is also pending.    TRUPTI (PDMP) Reviewed for Current/Active Medications      Past Surgical History:  Past Surgical History:   Procedure Laterality Date    EXPLORATORY LAPAROTOMY         Problem List:  There is no problem list on file for this patient.  Patient has no other significant medical issues and has had no surgery in the last 10 years.  He does not have a pre-existing hypotestosteronism probably from the excessive opiate use that has resulted in his PCP providing the patient with an injectable testosterone supine 8 at 200 mg every 2 weeks.  His PCP also provided him with buspirone 10 mg twice daily for his general anxiety disorder which is a problem he had when I first treated him 10 years ago.  The patient has stopped using the Lunesta he was getting which is appropriate and important as it is a potentially addictive substance.    Allergy:   Allergies   Allergen Reactions    Alpha-Gal Anaphylaxis    Other Anaphylaxis     Red meat, pork due to tick bite.         Current Medications:   Current Outpatient Medications   Medication Sig Dispense Refill    buprenorphine-naloxone (SUBOXONE) 8-2 MG per SL tablet Place 2 tablets under the tongue  Daily. 16 tablet 0    busPIRone (BUSPAR) 10 MG tablet       EPINEPHrine (EPIPEN) 0.3 MG/0.3ML solution auto-injector injection       GNP Essential One Daily tablet tablet       promethazine-dextromethorphan (PROMETHAZINE-DM) 6.25-15 MG/5ML syrup Take 5 mL by mouth 4 (Four) Times a Day As Needed for Cough. (Patient not taking: Reported on 6/5/2025) 120 mL 0    Testosterone Cypionate (DEPOTESTOTERONE CYPIONATE) 200 MG/ML injection        No current facility-administered medications for this visit.       Past Medical History:  Past Medical History:   Diagnosis Date    ADHD (attention deficit hyperactivity disorder)     Alcoholism     Allergy to alpha-gal     Depression     Substance abuse    The patient notes that when he is used Suboxone in the past which has moistly been bought off the street he has noticed that it helps him with his craving anxiety and depression.  We explained that it can help depression directly but the anxiety is improved indirectly because she has less chaos in his life when he is able to quit ripping and running on dope.    Family History:  Family History   Problem Relation Age of Onset    No Known Problems Mother     No Known Problems Father     No Known Problems Sister     No Known Problems Brother     No Known Problems Son     No Known Problems Daughter     No Known Problems Maternal Grandmother     No Known Problems Maternal Grandfather     No Known Problems Paternal Grandmother     No Known Problems Paternal Grandfather     No Known Problems Cousin     Rheum arthritis Neg Hx     Osteoarthritis Neg Hx     Asthma Neg Hx     Diabetes Neg Hx     Heart failure Neg Hx     Hyperlipidemia Neg Hx     Hypertension Neg Hx     Migraines Neg Hx     Rashes / Skin problems Neg Hx     Seizures Neg Hx     Stroke Neg Hx     Thyroid disease Neg Hx          Subjective      Review of Systems:   Review of Systems    PHQ-9 Score:       RYLEE-7 Score:   Feeling nervous, anxious or on edge: More than half the  "days  Not being able to stop or control worrying: More than half the days  Worrying too much about different things: Nearly every day  Trouble Relaxing: Nearly every day  Being so restless that it is hard to sit still: More than half the days  Feeling afraid as if something awful might happen: More than half the days  Becoming easily annoyed or irritable: More than half the days  RYLEE 7 Total Score: 16  If you checked any problems, how difficult have these problems made it for you to do your work, take care of things at home, or get along with other people: Very difficult    Patient History:   The following portions of the patient's history were reviewed and updated as appropriate: allergies, current medications, past family history, past medical history, past social history, past surgical history and problem list.     Medications:     Current Outpatient Medications:     buprenorphine-naloxone (SUBOXONE) 8-2 MG per SL tablet, Place 2 tablets under the tongue Daily., Disp: 16 tablet, Rfl: 0    busPIRone (BUSPAR) 10 MG tablet, , Disp: , Rfl:     EPINEPHrine (EPIPEN) 0.3 MG/0.3ML solution auto-injector injection, , Disp: , Rfl:     GNP Essential One Daily tablet tablet, , Disp: , Rfl:     promethazine-dextromethorphan (PROMETHAZINE-DM) 6.25-15 MG/5ML syrup, Take 5 mL by mouth 4 (Four) Times a Day As Needed for Cough. (Patient not taking: Reported on 6/5/2025), Disp: 120 mL, Rfl: 0    Testosterone Cypionate (DEPOTESTOTERONE CYPIONATE) 200 MG/ML injection, , Disp: , Rfl:     Objective     Physical Exam:  Physical Exam    Vital Signs:   Vitals:    06/05/25 0948   BP: 129/82   Pulse: 90   SpO2: 97%   Weight: 105 kg (231 lb)   Height: 185.4 cm (73\")       Lab Results (last 24 hours)       ** No results found for the last 24 hours. **                 Body mass index is 30.48 kg/m².     MENTAL STATUS EXAM   General Appearance:  Cleanly groomed and dressed  Eye Contact:  Good eye contact  Attitude:  Cooperative  Motor Activity: "  Normal gait, posture  Muscle Strength:  Normal  Speech:  Normal rate, tone, volume  Language:  Spontaneous  Mood and affect:  Normal, pleasant  Hopelessness:  Denies  Loneliness: Denies  Thought Process:  Logical  Associations/ Thought Content:  No delusions  Hallucinations:  None  Suicidal Ideations:  Not present  Homicidal Ideation:  Not present        Assessment / Plan      Diagnoses and all orders for this visit:    1. Opioid dependence in remission (Primary)  -     Behavioral Health Full Screen and Definitive Testing ([facility]) -; Future  -     buprenorphine-naloxone (SUBOXONE) 8-2 MG per SL tablet; Place 2 tablets under the tongue Daily.  Dispense: 16 tablet; Refill: 0  -     Hepatitis B Surface Antigen; Future  -     Hepatitis B Core Antibody, Total; Future  -     Hepatitis A Antibody, Total; Future  -     Hepatitis C Antibody; Future  -     Hepatitis B Surface Antibody; Future  -     Comprehensive Metabolic Panel; Future  -     CBC & Differential; Future  -     TSH Rfx On Abnormal To Free T4; Future  -     HIV-1 & HIV-2 Antibodies; Future  -     Behavioral Health Full Screen and Definitive Testing ([facility]) -    2. Other stimulant dependence, uncomplicated    3. Generalized anxiety disorder    4. Tetrahydrocannabinol (THC) dependence    5. Hypotestosteronism  -     Testosterone (Free & Total), LC / MS; Future           PLAN:  Safety: No acute safety concerns  Risk Assessment: Risk of self-harm acutely is low. Risk of self-harm chronically is also low, but could be further elevated in the event of treatment noncompliance and/or AODA.  Call the office if he is having any difficulty with his micro dosing transition over the next 30 hours for guidance.  Also go get his lab work today or tomorrow so we have the test results back by the time he comes back to see us in 1 week.  We used the sailboat lost his see analogy to explain the nature of addiction to the patient and his fiancée.  He now understands that the use of  Suboxone will be for years not weeks or months in order to allow brain healing if it ever occurs.  Even if he ever comes off the Suboxone at that point in several years he still will be in recovery the rest of his life since addiction as in most people like this patient is due to a chronic brain disorder.I spent 90 minutes caring for Romulo on this date of service. This time includes time spent by me in the following activities: preparing for the visit, performing a medically appropriate examination and/or evaluation, counseling and educating the patient/family/caregiver, documenting information in the medical record, independently interpreting results and communicating that information with the patient/family/caregiver, care coordination, ordering medications, ordering test(s), obtaining a separately obtained history, and reviewing a separately obtained history.     TREATMENT PLAN/GOALS: Initiate supportive psychotherapy efforts and medications as indicated. Treatment and medication options discussed during today's visit. Patient acknowledged and verbally consented to begin the agreed upon treatment plan and was educated on the importance of compliance with treatment and follow-up appointments.  Patient has reviewed and signed their informed consent agreement and their controlled substances contract.  They have also received a copy of the controlled substances contract.    MEDICATION ISSUES:  TRUPTI reviewed as expected.  Discussed medication options and treatment plan of prescribed medication as well as the risks, benefits, and side effects including potential falls, possible impaired driving and metabolic adversities among others. Patient is agreeable to call the office with any worsening of symptoms or onset of side effects. Patient is agreeable to call 911 or go to the nearest ER should he/she begin having SI/HI.    No follow-ups on file.             This document has been electronically signed by Houston BURGESS  MD Jose  June 5, 2025 11:48 EDT      Part of this note may be an electronic transcription/translation of spoken language to printed text using the Dragon Dictation System.

## 2025-06-07 LAB
6-ACETYLMORPHINE: NOT DETECTED NG/ML
ALCOHOL, ETHYL: NOT DETECTED MG/DL
AMPHETAMINE: NOT DETECTED NG/ML
BENZODIAZ BLD QL: NOT DETECTED NG/ML
BUPRENORPHINE SAL CFM-MCNC: NOT DETECTED NG/ML
COCAINE METABOLITE: NOT DETECTED NG/ML
CODEINE: NOT DETECTED NG/ML
CREATININE: 94 MG/DL
EDDP SERPL QL: NOT DETECTED NG/ML
FENTANYL SAL QL SCN: NOT DETECTED NG/ML
FENTANYL-EIA: DETECTED NG/ML
HYDROCODONE: NOT DETECTED NG/ML
HYDROMORPHONE: 866 NG/ML
METHAMPHETAMINE: NOT DETECTED NG/ML
MORPHINE: NOT DETECTED NG/ML
NORFENTANYL: NOT DETECTED NG/ML
NORHYDROCODONE: NOT DETECTED NG/ML
NOROXYCODONE: 2219 NG/ML
OPIATES: DETECTED NG/ML
OXIDANTS: NOT DETECTED UG/ML
OXYCODONE UR: 1410 NG/ML
OXYCODONE: DETECTED NG/ML
OXYMORPHONE: 1053 NG/ML
PCP: NOT DETECTED NG/ML
PH: 5.2 (ref 4.5–9)
REF LAB TEST METHOD: NORMAL
SPECIFIC GRAVITY, QUAN: 1.01 (ref 1–1.03)
THC: NOT DETECTED NG/ML

## 2025-06-10 ENCOUNTER — OFFICE VISIT (OUTPATIENT)
Age: 40
End: 2025-06-10
Payer: MEDICAID

## 2025-06-10 ENCOUNTER — LAB (OUTPATIENT)
Dept: LAB | Facility: HOSPITAL | Age: 40
End: 2025-06-10
Payer: MEDICAID

## 2025-06-10 DIAGNOSIS — F41.9 ANXIETY DISORDER WITH PANIC ATTACKS: ICD-10-CM

## 2025-06-10 DIAGNOSIS — F12.20 TETRAHYDROCANNABINOL (THC) DEPENDENCE: ICD-10-CM

## 2025-06-10 DIAGNOSIS — E34.9 HYPOTESTOSTERONISM: ICD-10-CM

## 2025-06-10 DIAGNOSIS — F17.200 NICOTINE DEPENDENCE WITH CURRENT USE: ICD-10-CM

## 2025-06-10 DIAGNOSIS — F11.20 OPIOID USE DISORDER, SEVERE, ON MAINTENANCE THERAPY, DEPENDENCE: Primary | ICD-10-CM

## 2025-06-10 DIAGNOSIS — F11.21 OPIOID DEPENDENCE IN REMISSION: ICD-10-CM

## 2025-06-10 LAB
ALBUMIN SERPL-MCNC: 3.8 G/DL (ref 3.5–5.2)
ALBUMIN/GLOB SERPL: 1.4 G/DL
ALP SERPL-CCNC: 76 U/L (ref 39–117)
ALT SERPL W P-5'-P-CCNC: 27 U/L (ref 1–41)
ANION GAP SERPL CALCULATED.3IONS-SCNC: 10 MMOL/L (ref 5–15)
AST SERPL-CCNC: 30 U/L (ref 1–40)
BASOPHILS # BLD AUTO: 0.04 10*3/MM3 (ref 0–0.2)
BASOPHILS NFR BLD AUTO: 0.6 % (ref 0–1.5)
BILIRUB SERPL-MCNC: 0.3 MG/DL (ref 0–1.2)
BUN SERPL-MCNC: 9 MG/DL (ref 6–20)
BUN/CREAT SERPL: 10.3 (ref 7–25)
CALCIUM SPEC-SCNC: 8.7 MG/DL (ref 8.6–10.5)
CHLORIDE SERPL-SCNC: 102 MMOL/L (ref 98–107)
CO2 SERPL-SCNC: 30 MMOL/L (ref 22–29)
CREAT SERPL-MCNC: 0.87 MG/DL (ref 0.76–1.27)
DEPRECATED RDW RBC AUTO: 44.2 FL (ref 37–54)
EGFRCR SERPLBLD CKD-EPI 2021: 113.3 ML/MIN/1.73
EOSINOPHIL # BLD AUTO: 0.24 10*3/MM3 (ref 0–0.4)
EOSINOPHIL NFR BLD AUTO: 3.6 % (ref 0.3–6.2)
ERYTHROCYTE [DISTWIDTH] IN BLOOD BY AUTOMATED COUNT: 12.7 % (ref 12.3–15.4)
GLOBULIN UR ELPH-MCNC: 2.7 GM/DL
GLUCOSE SERPL-MCNC: 78 MG/DL (ref 65–99)
HBV SURFACE AB SER RIA-ACNC: NORMAL
HBV SURFACE AG SERPL QL IA: NORMAL
HCT VFR BLD AUTO: 37.2 % (ref 37.5–51)
HCV AB SER QL: REACTIVE
HGB BLD-MCNC: 12.4 G/DL (ref 13–17.7)
HIV 1+2 AB+HIV1 P24 AG SERPL QL IA: NORMAL
IMM GRANULOCYTES # BLD AUTO: 0.01 10*3/MM3 (ref 0–0.05)
IMM GRANULOCYTES NFR BLD AUTO: 0.1 % (ref 0–0.5)
LYMPHOCYTES # BLD AUTO: 1.97 10*3/MM3 (ref 0.7–3.1)
LYMPHOCYTES NFR BLD AUTO: 29.4 % (ref 19.6–45.3)
MCH RBC QN AUTO: 31.3 PG (ref 26.6–33)
MCHC RBC AUTO-ENTMCNC: 33.3 G/DL (ref 31.5–35.7)
MCV RBC AUTO: 93.9 FL (ref 79–97)
MONOCYTES # BLD AUTO: 0.56 10*3/MM3 (ref 0.1–0.9)
MONOCYTES NFR BLD AUTO: 8.4 % (ref 5–12)
NEUTROPHILS NFR BLD AUTO: 3.87 10*3/MM3 (ref 1.7–7)
NEUTROPHILS NFR BLD AUTO: 57.9 % (ref 42.7–76)
NRBC BLD AUTO-RTO: 0 /100 WBC (ref 0–0.2)
PLATELET # BLD AUTO: 198 10*3/MM3 (ref 140–450)
PMV BLD AUTO: 10.7 FL (ref 6–12)
POTASSIUM SERPL-SCNC: 3.8 MMOL/L (ref 3.5–5.2)
PROT SERPL-MCNC: 6.5 G/DL (ref 6–8.5)
RBC # BLD AUTO: 3.96 10*6/MM3 (ref 4.14–5.8)
SODIUM SERPL-SCNC: 142 MMOL/L (ref 136–145)
TSH SERPL DL<=0.05 MIU/L-ACNC: 2.2 UIU/ML (ref 0.27–4.2)
WBC NRBC COR # BLD AUTO: 6.69 10*3/MM3 (ref 3.4–10.8)

## 2025-06-10 PROCEDURE — 86708 HEPATITIS A ANTIBODY: CPT

## 2025-06-10 PROCEDURE — 87340 HEPATITIS B SURFACE AG IA: CPT

## 2025-06-10 PROCEDURE — 84402 ASSAY OF FREE TESTOSTERONE: CPT

## 2025-06-10 PROCEDURE — 84403 ASSAY OF TOTAL TESTOSTERONE: CPT

## 2025-06-10 PROCEDURE — 86704 HEP B CORE ANTIBODY TOTAL: CPT

## 2025-06-10 PROCEDURE — 86803 HEPATITIS C AB TEST: CPT

## 2025-06-10 PROCEDURE — 36415 COLL VENOUS BLD VENIPUNCTURE: CPT

## 2025-06-10 PROCEDURE — G0432 EIA HIV-1/HIV-2 SCREEN: HCPCS

## 2025-06-10 PROCEDURE — 86706 HEP B SURFACE ANTIBODY: CPT

## 2025-06-10 PROCEDURE — 80050 GENERAL HEALTH PANEL: CPT

## 2025-06-10 NOTE — PROGRESS NOTES
Time In: 11:00 AM   Time out: 11:48 AM  Name of PCP: Carl Bernal MD   Referral source: No referring provider defined for this encounter.    Subjective     Chief Complaint: Substance Use     History of Present Illness:     History of Present Illness  The patient is a 40-year-old male being seen today for an initial evaluation for his substance use disorder at the recommendation of Dr. Houston Garcia.    He has a history of opioid addiction, specifically to pain medications, and is seeking assistance due to concerns about potential exposure to fentanyl. His current substance use includes Lortab, Percocet, and kratom, with a recent reduction in Lortab and Percocet intake due to difficulty in obtaining them. He typically consumes 5 tablets of 10 mg Lortab or Percocet twice daily but has abstained from these substances since his last visit to Dr. Garcia on 06/05/2025. He also reports no other opioid use since that date. His kratom use is primarily in the form of drinks, with occasional capsule use. He has been using kratom for several years and spends approximately $700 per week on drugs. He has previously found Suboxone beneficial and is considering switching from tablets to films due to oral discomfort. He has not had a recent checkup or blood work and acknowledges the need for a liver function test. He has had some dental issues but no other health problems.    He is currently prescribed Xanax by his family doctor, Dr. Bernal, for severe anxiety and panic attacks, which he has been managing for about a month. He reports no abuse of this medication, taking it daily or every few days as needed. He experiences social anxiety, particularly in public places, and has a long-standing habit of avoiding interactions with familiar individuals. He has not been hospitalized for mental health issues but has sought various treatments for his anxiety without success. He plans to start therapy next week with Tangela at  [facility], who will address both his mental health and addiction issues.    Social History:  - Works as a   - Lives with his girlfriend and her two sons, aged 17 and 18  - Shares custody of his 10-year-old twin boys with their mother  - Has a history of legal issues related to drug charges, including two DUIs in 2004 and 2011  - Currently on probation for a methamphetamine charge from 2021  - Spent six months in [facility] for this charge and was terminated from drug court after three years due to positive tests for kratom  - Identifies as Mormon and primarily relies on his brother, sister, mother, father, and girlfriend for support    Psychiatric History:  - History of methamphetamine addiction but reports no current use  - Introduced to opioids at age 24 by his uncle, a pill dealer, and recognized his addiction at age 25  - Has used hallucinogenic mushrooms a few times, primarily for microdosing to manage his anxiety  - Has attended AA meetings in the past and plans to resume attendance  - Has had a sponsor in the past and believes he could still reach out to him  - Has sought various treatments for anxiety without success  - Plans to start therapy next week with Tangela at [facility]    Substance Use:  - Current use of Lortab, Percocet, and kratom  - Reports no other opioid use since 06/05/2025  - Reports no current methamphetamine use  - Reports smoking marijuana occasionally, about every few days  - Reports no alcohol use      Pertinent Negatives:  - Reports no family history of substance use  - Reports no trauma history  - Reports no history of suicide attempts or ideation  - Reports no major accidents or tragic events  - Reports no domestic violence    SOCIAL HISTORY  He does not drink alcohol. He smokes marijuana every few days, sometimes going two days in a row and then taking a break for four or five days. He is not  but plans to get . He works as a . He lives with his girlfriend  and her two sons, ages 18 and 17, as well as the 18-year-old's girlfriend and baby. He has two 10-year-old twin boys who live with their mother, and he shares custody with her.    FAMILY HISTORY  His father used to be an alcoholic but quit when the patient was around 25 or 26 years old; he now drinks occasionally when playing golf. His mother does not drink. His brother drinks and smokes, but his sister does not have any substance abuse issues.      Patient adamantly and convincingly denies current suicidal or homicidal ideation or perceptual disturbance.     Childhood Experiences:   Has patient experienced a major accident or tragic events as a child? no     Has patient experienced any other significant life events or trauma (such as verbal, physical, sexual abuse)? no    Significant Life Events:  Has patient been through or witnessed a divorce? no    Has patient experienced a death / loss of relationship? no    Has patient experienced a major accident or tragic events? no     Has patient experienced any other significant life events or trauma (such as verbal, physical, sexual abuse)? no    Social History:   Social History     Socioeconomic History    Marital status: Single    Number of children: 2    Years of education: 12TH GRADE    Highest education level: 12th grade   Tobacco Use    Smoking status: Former     Current packs/day: 0.50     Average packs/day: 0.5 packs/day for 3.0 years (1.5 ttl pk-yrs)     Types: Cigarettes    Smokeless tobacco: Current     Types: Chew   Vaping Use    Vaping status: Every Day    Substances: Nicotine   Substance and Sexual Activity    Alcohol use: No    Drug use: Not Currently     Types: IV, Methamphetamines    Sexual activity: Yes     Partners: Female       Marital Status: not     Patient's current living situation: Patient lives He lives with his girlfriend and her two sons, ages 18 and 17, as well as the 18-year-old's girlfriend and baby. He has two 10-year-old twin boys  who live with their mother, and he shares custody with her.    Support system: His support system includes his parents, siblings and his girlfriend.    Difficulty getting along with peers: no    Difficulty making new friendships: yes    Difficulty maintaining friendships: no    Close with family members: yes    Religous: yes    Work History:  Highest level of education obtained: 12th grade    Ever been active duty in the ? 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    Legal History:  Has a history of legal issues related to drug charges, including two DUIs in 2004 and 2011 and multiple possession charges.  - Currently on probation for a methamphetamine charge from 2021  - Spent six months in long-term for this charge and was terminated from drug court after three years due to positive tests for kratom    Past Medical History:  Past Medical History:   Diagnosis Date    ADHD (attention deficit hyperactivity disorder)     Alcoholism     Allergy to alpha-gal     Depression     Substance abuse        Past Surgical History:  Past Surgical History:   Procedure Laterality Date    EXPLORATORY LAPAROTOMY         Physical Exam:   There were no vitals taken for this visit. There is no height or weight on file to calculate BMI.     History of psychiatric treatment or hospitalization: Yes, describe: He is currently prescribed Xanax by his family doctor, Dr. Bernal, for severe anxiety and panic attacks, which he has been managing for about a month. He reports no abuse of this medication, taking it daily or every few days as needed. He experiences social anxiety, particularly in public places, and has a long-standing habit of avoiding interactions with familiar individuals. He has not been hospitalized for mental health issues but has sought various treatments for his  anxiety without success.      Allergy:   Allergies   Allergen Reactions    Alpha-Gal Anaphylaxis    Other Anaphylaxis     Red meat, pork due to tick bite.         Current Medications:   Current Outpatient Medications   Medication Sig Dispense Refill    buprenorphine-naloxone (SUBOXONE) 8-2 MG per SL tablet Place 2 tablets under the tongue Daily. 16 tablet 0    busPIRone (BUSPAR) 10 MG tablet       EPINEPHrine (EPIPEN) 0.3 MG/0.3ML solution auto-injector injection       GNP Essential One Daily tablet tablet       Testosterone Cypionate (DEPOTESTOTERONE CYPIONATE) 200 MG/ML injection        No current facility-administered medications for this visit.       Family History:  Family History   Problem Relation Age of Onset    No Known Problems Mother     No Known Problems Father     No Known Problems Sister     No Known Problems Brother     No Known Problems Son     No Known Problems Daughter     No Known Problems Maternal Grandmother     No Known Problems Maternal Grandfather     No Known Problems Paternal Grandmother     No Known Problems Paternal Grandfather     No Known Problems Cousin     Rheum arthritis Neg Hx     Osteoarthritis Neg Hx     Asthma Neg Hx     Diabetes Neg Hx     Heart failure Neg Hx     Hyperlipidemia Neg Hx     Hypertension Neg Hx     Migraines Neg Hx     Rashes / Skin problems Neg Hx     Seizures Neg Hx     Stroke Neg Hx     Thyroid disease Neg Hx        Problem List:  There is no problem list on file for this patient.        History of Substance Use:   Patient answered YES to experiencing two or more of the following problems related to substance use: using more than intended or over longer period than intended; difficulty quitting or cutting back use; spending a great deal of time obtaining, using, or recovering from using; craving or strong desire or urge to use;  work and/or school problems; financial problems; family problems; using in dangerous situations; physical or mental health problems;  relapse; feelings of guilt or remorse about use; times when used and/or drank alone; needing to use more in order to achieve the desired effect; illness or withdrawal when stopping or cutting back use; using to relieve or avoid getting ill or developing withdrawal symptoms; and black outs and/or memory issues when using.      The patient has a history of using Lortab, Percocet, and kratom. Recently, he has reduced his intake of Lortab and Percocet due to difficulties in obtaining these medications. Typically, he consumes 5 tablets of 10 mg Lortab or Percocet twice daily. However, he has abstained from these substances since his last visit to Dr. Garcia on 06/05/2025 and reports no other opioid use since that date. His kratom use is primarily in the form of drinks, with occasional use of capsules. He has been using kratom for several years and spends approximately $700 per week on drugs. The patient has undergone multiple inpatient court-ordered treatment episodes at [facility], [facility], and [facility]. He has an extensive legal history related to his substance use disorder, including two DUIs in 2004 and 2011, multiple possession charges, and is currently on probation for a methamphetamine charge from 2021. He [facility] in [facility] for the methamphetamine charge and was terminated from drug court after three years due to positive tests for kratom.      PHQ-Score Total:  PHQ-9 Total Score: 9    RYLEE-7 Score Total:  Over the last two weeks, how often have you been bothered by the following problems?  Feeling nervous, anxious or on edge: Several days  Not being able to stop or control worrying: Several days  Worrying too much about different things: Several days  Trouble Relaxing: Several days  Being so restless that it is hard to sit still: More than half the days  Becoming easily annoyed or irritable: Several days  Feeling afraid as if something awful might happen: Several days  RYLEE 7  Total Score: 8  If you checked any problems, how difficult have these problems made it for you to do your work, take care of things at home, or get along with other people: Somewhat difficult    MENTAL STATUS EXAM   General Appearance:  Cleanly groomed and dressed  Eye Contact:  Good eye contact  Attitude:  Cooperative  Motor Activity:  Normal gait, posture  Muscle Strength:  Normal  Speech:  Normal rate, tone, volume  Mood and affect:  Normal, pleasant  Hopelessness:  Denies  Loneliness: Denies  Thought Process:  Logical  Associations/ Thought Content:  No delusions  Hallucinations:  None  Suicidal Ideations:  Not present  Homicidal Ideation:  Not present  Sensorium:  Alert  Orientation:  Person, place, time and situation  Immediate Recall, Recent, and Remote Memory:  Intact  Attention Span/ Concentration:  Good  Fund of Knowledge:  Appropriate for age and educational level  Intellectual Functioning:  Average range  Insight:  Good  Judgement:  Good  Reliability:  Good  Impulse Control:  Good       Impression/Formulation:  Patient appeared alert and oriented.  Patient is voluntarily requesting to begin outpatient therapy at [facility].  Patient is receptive to assistance with maintaining a stable lifestyle.  Patient presents with history of No chief complaint on file.     Patient is agreeable to attend routine therapy sessions.  Patient expressed desire to maintain stability and participate in the therapeutic process.      Assessment and Plan:     Assessment & Plan    Content of Therapy:  During the session, the patient discussed his history of opioid use, including Lortab and Percocet, and his current heavy use of kratom. He expressed fear of encountering fentanyl-laced substances and the desire to seek help. The patient mentioned previous use of Suboxone and its effectiveness. He also discussed his severe anxiety and panic attacks, for which he is prescribed Xanax. The patient  shared his probation status for a methamphetamine charge and the need to contact his . Themes of readiness for recovery, the impact of substance use on relationships, and the importance of sober support were explored.    Clinical Impression:  The patient appears motivated to address his substance use disorder and anxiety. He has a significant history of opioid use and is currently using kratom heavily. He reports no recent opioid use since 06/05/2025 . His anxiety and panic attacks are managed with Xanax, prescribed by his family doctor. The patient is aware of the need for counseling and sober support to aid in his recovery. He is on probation for a methamphetamine charge and needs to ensure compliance with probation requirements.    Therapeutic Intervention:  - Discussion on the importance of medication-assisted treatment with Suboxone and counseling.  - Encouragement to attend sober support groups such as Alcoholics Anonymous or Narcotics Anonymous.  - Exploration of the patient's readiness for recovery and the impact of substance use on his relationships.  - Emphasis on the need for a relapse prevention plan.    Plan:  - Continue Suboxone treatment.  - Engage in counseling for substance use and anxiety.  - Attend sober support groups.  - Develop a relapse prevention plan during the next visit.  - Contact  to ensure compliance with probation requirements.    Follow-up:  - Follow-up appointment scheduled for next week to develop a relapse prevention plan.    Notes & Risk Factors:  - Risk factors include heavy use of kratom and history of opioid use.  - Protective factors include motivation for recovery, support from family, and engagement in counseling.  - *      Visit Diagnoses:    ICD-10-CM ICD-9-CM   1. Opioid use disorder, severe, on maintenance therapy, dependence  F11.20 304.00   2. Nicotine dependence with current use  F17.200 305.1   3. Tetrahydrocannabinol (THC)  dependence  F12.20 304.30   4. Anxiety disorder with panic attacks  F41.9 300.00        Prognosis: Guarded with Ongoing Treatment    Return in about 1 week (around 6/17/2025) for Next scheduled follow up.      Treatment Plan: Continue supportive psychotherapy efforts and medications as indicated. Obtain release of information for current treatment team for continuity of care as needed. Patient will adhere to medication regimen as prescribed and report any side effects. Patient will contact this office, call 911 or present to the nearest emergency room should suicidal or homicidal ideations occur.    Short Term Goals: Patient will be compliant with medication, and patient will have no significant medication related side effects.  Patient will be engaged in psychotherapy as indicated.  Patient will report subjective improvement of symptoms.    Long Term Goals: To stabilize mood and treat/improve subjective symptoms, the patient will stay out of the hospital, the patient will be at an optimal level of functioning, and the patient will take all medications as prescribed.The patient verbalized understanding and agreement with goals that were mutually set.    Crisis Plan:  If symptoms/behaviors persist, patient will present to the nearest hospital for an assessment. Advised patient of [facility] 24/7 assessment services.            This document has been electronically signed by STUART Benjamin  Belkis 10, 2025 11:05 EDT     Part of this note may be an electronic transcription/translation of spoken language to printed text using the Dragon Dictation System.     Patient or patient representative verbalized consent for the use of Ambient Listening during the visit with  STUART Benjamin for chart documentation. 6/10/2025  12:17 EDT

## 2025-06-12 ENCOUNTER — OFFICE VISIT (OUTPATIENT)
Age: 40
End: 2025-06-12

## 2025-06-12 VITALS
BODY MASS INDEX: 32.34 KG/M2 | HEART RATE: 75 BPM | OXYGEN SATURATION: 98 % | WEIGHT: 244 LBS | DIASTOLIC BLOOD PRESSURE: 84 MMHG | HEIGHT: 73 IN | SYSTOLIC BLOOD PRESSURE: 136 MMHG

## 2025-06-12 DIAGNOSIS — E34.9 HYPOTESTOSTERONISM: ICD-10-CM

## 2025-06-12 DIAGNOSIS — F11.21 OPIOID DEPENDENCE IN REMISSION: Primary | ICD-10-CM

## 2025-06-12 LAB
HAV AB SER QL IA: NEGATIVE
HBV CORE AB SERPL QL IA: NEGATIVE

## 2025-06-12 RX ORDER — BUPRENORPHINE HYDROCHLORIDE AND NALOXONE HYDROCHLORIDE DIHYDRATE 8; 2 MG/1; MG/1
2 TABLET SUBLINGUAL DAILY
Qty: 16 TABLET | Refills: 0 | Status: SHIPPED | OUTPATIENT
Start: 2025-06-12 | End: 2025-06-12

## 2025-06-12 RX ORDER — BUPRENORPHINE AND NALOXONE 8; 2 MG/1; MG/1
2 FILM, SOLUBLE BUCCAL; SUBLINGUAL DAILY
Qty: 16 EACH | Refills: 0 | Status: SHIPPED | OUTPATIENT
Start: 2025-06-12

## 2025-06-12 NOTE — PROGRESS NOTES
Office  Note     Patient Name: Romulo Monahan  : 1985   MRN: [id number]     Referring Provider: Carl Bernla MD    Chief Complaint: Substance use    History of Present Illness:   HPI    Romulo Monahan is a 40 y.o. male who is here today for follow up related to his opiate use disorder.  He is on 16 mg of Suboxone tablets which she has been taking correctly as to tablets of the 8 mg dose every morning once a day.  He complains that the 2 tablets in his mouth at the same time swells and that while all of powder so he would like to switch to the films.    He has relapsed on some marijuana most days and even on 1 day he took a bottle of kratom which he picked up on his way home from work and late afternoon from the [employer].  He was not having any opiate withdrawal physically at all.  His Suboxone did exactly what it was supposed to do form.  He just got agitated on his way home feeling a little tired than he got some craving he started to get anxious which we predicted to him that this would happen a week ago when we first wrote the prescription for Suboxone.  He has experienced that common pattern where he did not stay adequately busy working right at the bedtime on his recovery.  He thought the medicine would do all the heavy lifting and he counted on a tube through the eliminate all of his craving.  We had warned him that it would block 100% of the craving he gets from having opiate withdrawal because he will have no opiate withdrawal.  However the Suboxone cannot block all craving because he can have craving from being discontent for all sorts of reasons otherwise besides withdrawals.    Another relapse trigger that is probably significant is the fact that his girlfriend uses kratom anywhere from twice a week to twice a month for energy which is also addictive behavior.  She is not as sick as he is apparently but it would be healthy for him if she quit bringing the kratom home or in his  environment.  It would also be better for her if she does stop using it completely and worked on her energy level with appropriate amounts of sleep good food and exercise.    So if he is going to be successful he has to change a lot of different behaviors to build a solid recovery program not just take a dose of medicine in the morning every day.  This will include going back to the gym for example which she said he had already planned anyway 2 or 3 days a week.  Also includes going to 2 his three 12-step support group meetings in the evenings to keep himself busy and deflecting from the craving that his sick hypothalamus will continue to produce when he gets bored and tired toward the end of the day for the foreseeable future.  He needs to manages people places and things more aggressively like getting the kratom out of his environment.  We explained that sometimes you even have to tell somebody you love goodbye because his recovery.  Is not selfish to tell somebody know.  Is not on Christians of put yourself first at times when the other person is being unreasonable.  He says that she will be okay with not using any more kratom at all so we will see up about that.  He also understands in his next visit next week that we will be asking him how many 12-step support group meetings he went to since his last visit here.    He did clarify that his long stretch of shelter was the 6 months that he just got out of a month ago.  He did destroy all the left over oxycodone he had at home he said.  He has agreed to come to our therapist on a regular basis instead of using the therapist at community [facility] which will be better for several reasons including keeping his Comprehensive Care under 1 roof and using a therapist who is actually a [facility].  Lastly he had his blood work come back with a positive hepatitis C antibody.  He said he had treatment for that hepatitis C about 7 8 years ago with pills only no injections after which she  was told that his hepatitis C was cured.  He also was slightly anemic with a hemoglobin of 12.5.  Room on a repeat that CBC on another lab draws as well as do a PCR viral load for hepatitis C.  We juan manuel blood last week also for his testosterone level but that report is still pending.      Triggers: Triggers are his girlfriend using the kratom and him getting bored and not staying busy.    Cravings: He has had cravings every day to which she is succumbed most frequently with marijuana but with most dangerously kratom.  His drug screen came back with fentanyl which was probably in the fake Percocets that he had been using.    Relapse Prevention: Relapse prevention includes seeing her therapist again soon who he saw for the first time 2 days ago.  He will come back with the physician in 1 week.  He is going to eat every day either go to the gym or go to a 12-step meeting.  He is going to ask his girlfriend to stop using kratom or at least keep it out of his face.    UDS Confirmation: UDS confirmed for opiates THC and fentanyl    TRUPTI (PDMP) Reviewed for Current/Active Medications      Past Surgical History:  Past Surgical History:   Procedure Laterality Date    EXPLORATORY LAPAROTOMY         Problem List:  There is no problem list on file for this patient.      Allergy:   Allergies   Allergen Reactions    Alpha-Gal Anaphylaxis    Other Anaphylaxis     Red meat, pork due to tick bite.         Current Medications:   Current Outpatient Medications   Medication Sig Dispense Refill    buprenorphine-naloxone (SUBOXONE) 8-2 MG film film Place 2 films under the tongue Daily. 16 each 0    busPIRone (BUSPAR) 10 MG tablet       EPINEPHrine (EPIPEN) 0.3 MG/0.3ML solution auto-injector injection       GNP Essential One Daily tablet tablet       Testosterone Cypionate (DEPOTESTOTERONE CYPIONATE) 200 MG/ML injection        No current facility-administered medications for this visit.       Past Medical History:  Past Medical History:    Diagnosis Date    ADHD (attention deficit hyperactivity disorder)     Alcoholism     Allergy to alpha-gal     Depression     Substance abuse        Social History:  Social History     Socioeconomic History    Marital status: Single    Number of children: 2    Years of education: 12TH GRADE    Highest education level: 12th grade   Tobacco Use    Smoking status: Former     Current packs/day: 0.50     Average packs/day: 0.5 packs/day for 3.0 years (1.5 ttl pk-yrs)     Types: Cigarettes    Smokeless tobacco: Current     Types: Chew   Vaping Use    Vaping status: Every Day    Substances: Nicotine   Substance and Sexual Activity    Alcohol use: No    Drug use: Not Currently     Types: IV, Methamphetamines    Sexual activity: Yes     Partners: Female       Social History     Social History Narrative    Social History:    Social Support: GIRLFRIEND    Residence: living setting HOME with GIRLFRIEND    Current Employment: [employer]    Education: 12TH GRADE    Learning Disabilities: ADHD    Legal history: ON PROBATION    Hobbies/interests: WORK AND HUNT    Restorationism: Roman Catholic    Exercise: DAILY    Dietary issues: NONE    Sleep issues: NONE    Caffeine intake: OCCASIONALLY     history: NONE         Family History:  Family History   Problem Relation Age of Onset    No Known Problems Mother     No Known Problems Father     No Known Problems Sister     No Known Problems Brother     No Known Problems Son     No Known Problems Daughter     No Known Problems Maternal Grandmother     No Known Problems Maternal Grandfather     No Known Problems Paternal Grandmother     No Known Problems Paternal Grandfather     No Known Problems Cousin     Rheum arthritis Neg Hx     Osteoarthritis Neg Hx     Asthma Neg Hx     Diabetes Neg Hx     Heart failure Neg Hx     Hyperlipidemia Neg Hx     Hypertension Neg Hx     Migraines Neg Hx     Rashes / Skin problems Neg Hx     Seizures Neg Hx     Stroke Neg Hx     Thyroid disease Neg Hx   "        Subjective      Review of Systems:   Review of Systems    PHQ-9 Score:       RYLEE-7 Score:        Patient History:   The following portions of the patient's history were reviewed and updated as appropriate: allergies, current medications, past family history, past medical history, past social history, past surgical history and problem list.     Social:  Social History     Socioeconomic History    Marital status: Single    Number of children: 2    Years of education: 12TH GRADE    Highest education level: 12th grade   Tobacco Use    Smoking status: Former     Current packs/day: 0.50     Average packs/day: 0.5 packs/day for 3.0 years (1.5 ttl pk-yrs)     Types: Cigarettes    Smokeless tobacco: Current     Types: Chew   Vaping Use    Vaping status: Every Day    Substances: Nicotine   Substance and Sexual Activity    Alcohol use: No    Drug use: Not Currently     Types: IV, Methamphetamines    Sexual activity: Yes     Partners: Female       Medications:     Current Outpatient Medications:     buprenorphine-naloxone (SUBOXONE) 8-2 MG film film, Place 2 films under the tongue Daily., Disp: 16 each, Rfl: 0    busPIRone (BUSPAR) 10 MG tablet, , Disp: , Rfl:     EPINEPHrine (EPIPEN) 0.3 MG/0.3ML solution auto-injector injection, , Disp: , Rfl:     GNP Essential One Daily tablet tablet, , Disp: , Rfl:     Testosterone Cypionate (DEPOTESTOTERONE CYPIONATE) 200 MG/ML injection, , Disp: , Rfl:     Objective     Physical Exam:  Physical Exam    Vital Signs:   Vitals:    06/12/25 1304   BP: 136/84   Pulse: 75   SpO2: 98%   Weight: 111 kg (244 lb)   Height: 185.4 cm (73\")       Pill count: Did not bring    Body mass index is 32.19 kg/m².     MENTAL STATUS EXAM   General Appearance:  Cleanly groomed and dressed  Eye Contact:  Good eye contact  Attitude:  Cooperative  Motor Activity:  Normal gait, posture  Muscle Strength:  Normal  Speech:  Normal rate, tone, volume  Language:  Spontaneous  Mood and affect:  Normal, " pleasant  Hopelessness:  Denies  Loneliness: Denies  Thought Process:  Logical  Associations/ Thought Content:  No delusions  Hallucinations:  None  Suicidal Ideations:  Not present  Homicidal Ideation:  Not present          Assessment / Plan      Diagnoses and all orders for this visit:    1. Opioid dependence in remission (Primary)  -     Discontinue: buprenorphine-naloxone (SUBOXONE) 8-2 MG per SL tablet; Place 2 tablets under the tongue Daily.  Dispense: 16 tablet; Refill: 0  -     buprenorphine-naloxone (SUBOXONE) 8-2 MG film film; Place 2 films under the tongue Daily.  Dispense: 16 each; Refill: 0    2. Hypotestosteronism           PLAN:  Safety: No acute safety concerns  Risk Assessment: Risk of self-harm acutely is low. Risk of self-harm chronically is also low, but could be further elevated in the event of treatment noncompliance and/or AODA.    TREATMENT PLAN/GOALS: Continue supportive psychotherapy efforts and medications as indicated. Treatment and medication options discussed during today's visit. Patient acknowledged and verbally consented to continue with current treatment plan and was educated on the importance of compliance with treatment and follow-up appointments.    MEDICATION ISSUES:  TRUPTI reviewed as expected.  Discussed medication options and treatment plan of prescribed medication as well as the risks, benefits, and side effects including potential falls, possible impaired driving and metabolic adversities among others. Patient is agreeable to call the office with any worsening of symptoms or onset of side effects. Patient is agreeable to call 911 or go to the nearest ER should he/she begin having SI/HI. No medication side effects or related complaints today.     Return in about 1 week (around 6/19/2025).             This document has been electronically signed by Houston Garcia MD  June 12, 2025 14:27 EDT      Part of this note may be an electronic transcription/translation of spoken  language to printed text using the Dragon Dictation System.

## 2025-06-13 ENCOUNTER — PATIENT ROUNDING (BHMG ONLY) (OUTPATIENT)
Age: 40
End: 2025-06-13

## 2025-06-13 NOTE — PROGRESS NOTES
June 13, 2025    Hello, may I speak with Romulo Monahan?    My name is DELANO       I am  with [facility]  [facility]  [address]  [place] [id number] [phone].    Before we get started may I verify your date of birth? 1985    I am calling to officially welcome you to our practice and ask about your recent visit. Is this a good time to talk? No DID NOT ANSWER PHONE    Tell me about your visit with us. What things went well?  DID NOT ANSWER PHONE       We're always looking for ways to make our patients' experiences even better. Do you have recommendations on ways we may improve?  no DID NOT ANSWER PHONE     Overall were you satisfied with your first visit to our practice? No DID NOT ANSWER PHONE       I appreciate you taking the time to speak with me today. Is there anything else I can do for you? No DID NOT ANSWER PHONE    Thank you, and have a great day.

## 2025-06-17 LAB
TESTOST FREE SERPL-MCNC: 18.6 PG/ML (ref 8.7–25.1)
TESTOST SERPL-MCNC: 808.1 NG/DL (ref 264–916)

## 2025-06-24 ENCOUNTER — OFFICE VISIT (OUTPATIENT)
Age: 40
End: 2025-06-24
Payer: COMMERCIAL

## 2025-06-24 VITALS
BODY MASS INDEX: 32.87 KG/M2 | DIASTOLIC BLOOD PRESSURE: 81 MMHG | WEIGHT: 248 LBS | OXYGEN SATURATION: 98 % | HEIGHT: 73 IN | SYSTOLIC BLOOD PRESSURE: 141 MMHG | HEART RATE: 73 BPM

## 2025-06-24 DIAGNOSIS — F17.200 NICOTINE DEPENDENCE WITH CURRENT USE: ICD-10-CM

## 2025-06-24 DIAGNOSIS — E34.9 HYPOTESTOSTERONISM: ICD-10-CM

## 2025-06-24 DIAGNOSIS — F11.21 OPIOID DEPENDENCE IN REMISSION: Primary | ICD-10-CM

## 2025-06-24 LAB
AMPHET+METHAMPHET UR QL: NEGATIVE
AMPHETAMINE INTERNAL CONTROL: NORMAL
AMPHETAMINES UR QL: NEGATIVE
BARBITURATE INTERNAL CONTROL: NORMAL
BARBITURATES UR QL SCN: NEGATIVE
BENZODIAZ UR QL SCN: NEGATIVE
BENZODIAZEPINE INTERNAL CONTROL: NORMAL
BUPRENORPHINE INTERNAL CONTROL: NORMAL
BUPRENORPHINE SERPL-MCNC: NEGATIVE NG/ML
CANNABINOIDS SERPL QL: NEGATIVE
COCAINE INTERNAL CONTROL: NORMAL
COCAINE UR QL: NEGATIVE
EXPIRATION DATE: NORMAL
Lab: NORMAL
MDMA (ECSTASY) INTERNAL CONTROL: NORMAL
MDMA UR QL SCN: NEGATIVE
METHADONE INTERNAL CONTROL: NORMAL
METHADONE UR QL SCN: NEGATIVE
METHAMPHETAMINE INTERNAL CONTROL: NORMAL
MORPHINE INTERNAL CONTROL: NORMAL
MORPHINE/OPIATES SCREEN, URINE: NEGATIVE
OXYCODONE INTERNAL CONTROL: NORMAL
OXYCODONE UR QL SCN: NEGATIVE
PCP UR QL SCN: NEGATIVE
PHENCYCLIDINE INTERNAL CONTROL: NORMAL
THC INTERNAL CONTROL: NORMAL

## 2025-06-24 PROCEDURE — 99214 OFFICE O/P EST MOD 30 MIN: CPT | Performed by: FAMILY MEDICINE

## 2025-06-24 PROCEDURE — 80305 DRUG TEST PRSMV DIR OPT OBS: CPT | Performed by: FAMILY MEDICINE

## 2025-06-24 RX ORDER — BUPRENORPHINE AND NALOXONE 8; 2 MG/1; MG/1
2 FILM, SOLUBLE BUCCAL; SUBLINGUAL DAILY
Qty: 32 EACH | Refills: 0 | Status: SHIPPED | OUTPATIENT
Start: 2025-06-24

## 2025-06-24 NOTE — PROGRESS NOTES
"     Office  Note     Patient Name: Romulo Monahan  : 1985   MRN: [id number]     Referring Provider: Carl Bernal MD    Chief Complaint: Substance use    History of Present Illness:   HPI    Romulo Monahan is a 40 y.o. male who is here today for follow up related to his opiate use disorder.  The patient complains that he had some craving last week that fortunately did not result in a relapse.  At first he said he is not sure why the craving started but then he rapidly with further reflection analyzed the dynamic quite accurately.    He went to a \"friend\" house where he legitimately was going to buy some parts for his four-nguyễn.  While he was there another gentleman showed up to buy some dope from the owner of the home which triggered the patient's craving quite easily because before he got to the home he was having some underlying emotional discomfort.  This undertow of irritability and anxiety was related to a different relationship he had with somebody in his inner [place] that did not trust him in proportion to the effort that the patient has been making in recovery which is considerable.  So the patient became frustrated with the lack of trust which triggered some resentment which cycled back to some guilt and shame.  The patient is working on this latter issue with his therapist but it allowed the relapse trigger at the friend's house to ignite the craving acutely.    So we discussed how the learned from this episode by modifying his own behavior so he does not make the same mistake next time.  The patient now understands that if he is going to go back motor parts at a friend's house where drugs have been sold in the past he is should assume that that will occur in the future so he should either bring somebody within the by the automotive parts or send somebody else to buy the parts or get the part somewhere else.  This is a specific example of having multiple plans to achieve the same " goal in case plan a fails.  The general principal described here is how successful people are successful not because they are perfect but because they learn from their mistakes at a very efficient rate.  In this case the patient has learned to make multiple plans to achieve the same goal since either the patient or others involved in the plan can make a mistake where about the plan a fails.      Triggers: Triggers where an undercurrent of guilt by accessibility to drugs within an arms length.    Cravings: Cravings have abated now    Relapse Prevention: Relapse prevention as above.  He will see his therapist in 1 week.  He will see the doctor here in 2 weeks.  Since the patient's Medicaid insurance turned out to be an active the day part of his prevention plan will also be to get his Medicaid reinstated immediately since he took his last to Suboxone films this morning.  We did point out Plan B can be to about 2 or 3 days worth of film strips since they are about $5-$8 a piece which will give him a more time to get his insurance reinstated.    UDS Confirmation: No urine collected today mostly due to the fact that the patient's Medicaid insurance is an active which the patient was not aware of.    TRUPTI (PDMP) Reviewed for Current/Active Medications      Past Surgical History:  Past Surgical History:   Procedure Laterality Date    EXPLORATORY LAPAROTOMY         Problem List:  There is no problem list on file for this patient.      Allergy:   Allergies   Allergen Reactions    Alpha-Gal Anaphylaxis    Other Anaphylaxis     Red meat, pork due to tick bite.         Current Medications:   Current Outpatient Medications   Medication Sig Dispense Refill    buprenorphine-naloxone (SUBOXONE) 8-2 MG film film Place 2 films under the tongue Daily. 16 each 0    busPIRone (BUSPAR) 10 MG tablet       EPINEPHrine (EPIPEN) 0.3 MG/0.3ML solution auto-injector injection       GNP Essential One Daily tablet tablet       Testosterone  Cypionate (DEPOTESTOTERONE CYPIONATE) 200 MG/ML injection        No current facility-administered medications for this visit.       Past Medical History:  Past Medical History:   Diagnosis Date    ADHD (attention deficit hyperactivity disorder)     Alcoholism     Allergy to alpha-gal     Depression     Substance abuse        Social History:  Social History     Socioeconomic History    Marital status: Single    Number of children: 2    Years of education: 12TH GRADE    Highest education level: 12th grade   Tobacco Use    Smoking status: Former     Current packs/day: 0.50     Average packs/day: 0.5 packs/day for 3.0 years (1.5 ttl pk-yrs)     Types: Cigarettes    Smokeless tobacco: Current     Types: Chew   Vaping Use    Vaping status: Every Day    Substances: Nicotine   Substance and Sexual Activity    Alcohol use: No    Drug use: Not Currently     Types: IV, Methamphetamines    Sexual activity: Yes     Partners: Female       Social History     Social History Narrative    Social History:    Social Support: GIRLFRIEND    Residence: living setting HOME with GIRLFRIEND    Current Employment: [employer]    Education: 12TH GRADE    Learning Disabilities: ADHD    Legal history: ON PROBATION    Hobbies/interests: WORK AND HUNT    Yarsanism: Taoist    Exercise: DAILY    Dietary issues: NONE    Sleep issues: NONE    Caffeine intake: OCCASIONALLY     history: NONE         Family History:  Family History   Problem Relation Age of Onset    No Known Problems Mother     No Known Problems Father     No Known Problems Sister     No Known Problems Brother     No Known Problems Son     No Known Problems Daughter     No Known Problems Maternal Grandmother     No Known Problems Maternal Grandfather     No Known Problems Paternal Grandmother     No Known Problems Paternal Grandfather     No Known Problems Cousin     Rheum arthritis Neg Hx     Osteoarthritis Neg Hx     Asthma Neg Hx     Diabetes Neg Hx     Heart failure Neg Hx   "   Hyperlipidemia Neg Hx     Hypertension Neg Hx     Migraines Neg Hx     Rashes / Skin problems Neg Hx     Seizures Neg Hx     Stroke Neg Hx     Thyroid disease Neg Hx          Subjective      Review of Systems:   Review of Systems    PHQ-9 Score:       RYLEE-7 Score:        Patient History:   The following portions of the patient's history were reviewed and updated as appropriate: allergies, current medications, past family history, past medical history, past social history, past surgical history and problem list.     Social:  Social History     Socioeconomic History    Marital status: Single    Number of children: 2    Years of education: 12TH GRADE    Highest education level: 12th grade   Tobacco Use    Smoking status: Former     Current packs/day: 0.50     Average packs/day: 0.5 packs/day for 3.0 years (1.5 ttl pk-yrs)     Types: Cigarettes    Smokeless tobacco: Current     Types: Chew   Vaping Use    Vaping status: Every Day    Substances: Nicotine   Substance and Sexual Activity    Alcohol use: No    Drug use: Not Currently     Types: IV, Methamphetamines    Sexual activity: Yes     Partners: Female       Medications:     Current Outpatient Medications:     buprenorphine-naloxone (SUBOXONE) 8-2 MG film film, Place 2 films under the tongue Daily., Disp: 16 each, Rfl: 0    busPIRone (BUSPAR) 10 MG tablet, , Disp: , Rfl:     EPINEPHrine (EPIPEN) 0.3 MG/0.3ML solution auto-injector injection, , Disp: , Rfl:     GNP Essential One Daily tablet tablet, , Disp: , Rfl:     Testosterone Cypionate (DEPOTESTOTERONE CYPIONATE) 200 MG/ML injection, , Disp: , Rfl:     Objective     Physical Exam:  Physical Exam    Vital Signs:   Vitals:    06/24/25 1408   BP: 141/81   Pulse: 73   SpO2: 98%   Weight: 112 kg (248 lb)   Height: 185.4 cm (73\")       Pill count: Refill due today    Body mass index is 32.72 kg/m².     MENTAL STATUS EXAM        Assessment / Plan      Diagnoses and all orders for this visit:    1. Opioid dependence " in remission (Primary)  -     POC Medline 12 Panel Urine Drug Screen    2. Hypotestosteronism    3. Nicotine dependence with current use           PLAN:  Safety: No acute safety concerns  Risk Assessment: Risk of self-harm acutely is low. Risk of self-harm chronically is also low, but could be further elevated in the event of treatment noncompliance and/or AODA.    TREATMENT PLAN/GOALS: Continue supportive psychotherapy efforts and medications as indicated. Treatment and medication options discussed during today's visit. Patient acknowledged and verbally consented to continue with current treatment plan and was educated on the importance of compliance with treatment and follow-up appointments.    MEDICATION ISSUES:  TRUPTI reviewed as expected.  Discussed medication options and treatment plan of prescribed medication as well as the risks, benefits, and side effects including potential falls, possible impaired driving and metabolic adversities among others. Patient is agreeable to call the office with any worsening of symptoms or onset of side effects. Patient is agreeable to call 911 or go to the nearest ER should he/she begin having SI/HI. No medication side effects or related complaints today.     Return in about 2 weeks (around 7/8/2025).             This document has been electronically signed by Houston Garcia MD  June 24, 2025 15:00 EDT      Part of this note may be an electronic transcription/translation of spoken language to printed text using the Dragon Dictation System.